# Patient Record
Sex: FEMALE | Race: ASIAN | Employment: STUDENT | URBAN - METROPOLITAN AREA
[De-identification: names, ages, dates, MRNs, and addresses within clinical notes are randomized per-mention and may not be internally consistent; named-entity substitution may affect disease eponyms.]

---
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
## 2024-07-09 ENCOUNTER — HOSPITAL ENCOUNTER (OUTPATIENT)
Facility: HOSPITAL | Age: 24
Discharge: HOME | End: 2024-07-12
Payer: COMMERCIAL

## 2024-07-09 DIAGNOSIS — R22.31 MASS OF FINGER OF RIGHT HAND: ICD-10-CM

## 2024-07-09 PROCEDURE — 73218 MRI UPPER EXTREMITY W/O DYE: CPT

## 2024-09-30 ENCOUNTER — OFFICE VISIT (OUTPATIENT)
Age: 24
End: 2024-09-30
Payer: COMMERCIAL

## 2024-09-30 VITALS
HEIGHT: 66 IN | OXYGEN SATURATION: 99 % | BODY MASS INDEX: 22.85 KG/M2 | DIASTOLIC BLOOD PRESSURE: 74 MMHG | SYSTOLIC BLOOD PRESSURE: 108 MMHG | WEIGHT: 142.2 LBS | TEMPERATURE: 98.7 F | HEART RATE: 69 BPM | RESPIRATION RATE: 12 BRPM

## 2024-09-30 DIAGNOSIS — D17.21 LIPOMA OF RIGHT UPPER EXTREMITY: Primary | ICD-10-CM

## 2024-09-30 PROCEDURE — 99203 OFFICE O/P NEW LOW 30 MIN: CPT | Performed by: SURGERY

## 2024-09-30 ASSESSMENT — PATIENT HEALTH QUESTIONNAIRE - PHQ9
SUM OF ALL RESPONSES TO PHQ9 QUESTIONS 1 & 2: 0
SUM OF ALL RESPONSES TO PHQ QUESTIONS 1-9: 0
1. LITTLE INTEREST OR PLEASURE IN DOING THINGS: NOT AT ALL
SUM OF ALL RESPONSES TO PHQ QUESTIONS 1-9: 0
2. FEELING DOWN, DEPRESSED OR HOPELESS: NOT AT ALL

## 2024-09-30 NOTE — PROGRESS NOTES
Ashleigh Ventura is a 23 y.o. female who is referred by Dr. Martin for further evaluation of a lipoma on her right forearm.     Information obtained from patient and review of chart.     Ms. Ventura tells me that she has had a subcutaneous mass on her right forearm for approximately five months now. No significant change in the size of the mass. No associated discomfort, bleeding or drainage. Found to have a lipoma.   She has otherwise been in her usual state of health.     MRI [facility] without contrast - 7/10/2024 - A discrete soft tissue mass is not demonstrated.    Past Medical History:   Diagnosis Date    ADHD (attention deficit hyperactivity disorder)     Lipoma of right upper extremity 09/30/2024     History reviewed. No pertinent surgical history.    Family History   Problem Relation Age of Onset    Breast Cancer Maternal Grandmother     Cancer Maternal Grandmother         Squamous cell carinoma    Cancer Paternal Grandfather         Lung cancer     Social History     Socioeconomic History    Marital status: Single     Spouse name: None    Number of children: None    Years of education: None    Highest education level: None   Tobacco Use    Smoking status: Never    Smokeless tobacco: Never   Substance and Sexual Activity    Alcohol use: Yes     Alcohol/week: 1.0 standard drink of alcohol     Types: 1 Glasses of wine per week    Drug use: Never    Sexual activity: Not Currently     Review of systems negative except as noted.    Review of Systems   Skin:         Denies discomfort at site of lipoma.     Physical Exam  Vitals reviewed.   Constitutional:       General: She is not in acute distress.     Appearance: Normal appearance. She is normal weight.   HENT:      Head: Normocephalic and atraumatic.   Eyes:      General: No scleral icterus.  Cardiovascular:      Rate and Rhythm: Normal rate and regular rhythm.   Pulmonary:      Effort: Pulmonary effort is normal.      Breath sounds: Normal breath sounds.

## 2024-09-30 NOTE — PROGRESS NOTES
Identified patient with two patient identifiers (name and ). Reviewed chart in preparation for visit and have obtained necessary documentation.    Ashleigh Ventura is a 23 y.o. female  Chief Complaint   Patient presents with    New Patient    Mass     Right forearm     /74 (Site: Right Upper Arm, Position: Sitting, Cuff Size: Large Adult)   Pulse 69   Temp 98.7 °F (37.1 °C) (Oral)   Resp 12   Ht 1.676 m (5' 6\")   Wt 64.5 kg (142 lb 3.2 oz)   SpO2 99%   BMI 22.95 kg/m²     1. Have you been to the ER, urgent care clinic since your last visit?  Hospitalized since your last visit?no    2. Have you seen or consulted any other health care providers outside of the [facility] since your last visit?  Include any pap smears or colon screening. no